# Patient Record
Sex: FEMALE | Race: OTHER | URBAN - METROPOLITAN AREA
[De-identification: names, ages, dates, MRNs, and addresses within clinical notes are randomized per-mention and may not be internally consistent; named-entity substitution may affect disease eponyms.]

---

## 2020-11-25 ENCOUNTER — HOSPITAL ENCOUNTER (INPATIENT)
Age: 45
LOS: 5 days | Discharge: HOME | DRG: 885 | End: 2020-11-30
Attending: PSYCHIATRY & NEUROLOGY | Admitting: PSYCHIATRY & NEUROLOGY
Payer: MEDICARE

## 2020-11-25 PROBLEM — F31.9 BIPOLAR DISORDER (HCC): Status: ACTIVE | Noted: 2020-11-25

## 2020-11-25 PROCEDURE — 74011250637 HC RX REV CODE- 250/637: Performed by: PSYCHIATRY & NEUROLOGY

## 2020-11-25 PROCEDURE — 65220000003 HC RM SEMIPRIVATE PSYCH

## 2020-11-25 RX ORDER — BENZTROPINE MESYLATE 1 MG/1
1 TABLET ORAL
Status: DISCONTINUED | OUTPATIENT
Start: 2020-11-25 | End: 2020-11-30 | Stop reason: HOSPADM

## 2020-11-25 RX ORDER — DIPHENHYDRAMINE HYDROCHLORIDE 50 MG/ML
50 INJECTION, SOLUTION INTRAMUSCULAR; INTRAVENOUS
Status: DISCONTINUED | OUTPATIENT
Start: 2020-11-25 | End: 2020-11-30 | Stop reason: HOSPADM

## 2020-11-25 RX ORDER — OLANZAPINE 5 MG/1
5 TABLET ORAL
Status: DISCONTINUED | OUTPATIENT
Start: 2020-11-25 | End: 2020-11-30 | Stop reason: HOSPADM

## 2020-11-25 RX ORDER — AMITRIPTYLINE HYDROCHLORIDE 100 MG/1
100 TABLET, FILM COATED ORAL
COMMUNITY
End: 2020-11-30

## 2020-11-25 RX ORDER — HYDROXYZINE 50 MG/1
50 TABLET, FILM COATED ORAL
Status: DISCONTINUED | OUTPATIENT
Start: 2020-11-25 | End: 2020-11-30 | Stop reason: HOSPADM

## 2020-11-25 RX ORDER — LORAZEPAM 2 MG/ML
1 INJECTION INTRAMUSCULAR
Status: DISCONTINUED | OUTPATIENT
Start: 2020-11-25 | End: 2020-11-30 | Stop reason: HOSPADM

## 2020-11-25 RX ORDER — IBUPROFEN 200 MG
1 TABLET ORAL DAILY
Status: DISCONTINUED | OUTPATIENT
Start: 2020-11-25 | End: 2020-11-30 | Stop reason: HOSPADM

## 2020-11-25 RX ORDER — HALOPERIDOL 5 MG/ML
5 INJECTION INTRAMUSCULAR
Status: DISCONTINUED | OUTPATIENT
Start: 2020-11-25 | End: 2020-11-30 | Stop reason: HOSPADM

## 2020-11-25 RX ORDER — ACETAMINOPHEN 325 MG/1
650 TABLET ORAL
Status: DISCONTINUED | OUTPATIENT
Start: 2020-11-25 | End: 2020-11-30 | Stop reason: HOSPADM

## 2020-11-25 RX ORDER — ADHESIVE BANDAGE
30 BANDAGE TOPICAL DAILY PRN
Status: DISCONTINUED | OUTPATIENT
Start: 2020-11-25 | End: 2020-11-30 | Stop reason: HOSPADM

## 2020-11-25 RX ORDER — TRAZODONE HYDROCHLORIDE 50 MG/1
50 TABLET ORAL
Status: DISCONTINUED | OUTPATIENT
Start: 2020-11-25 | End: 2020-11-30 | Stop reason: HOSPADM

## 2020-11-25 RX ADMIN — HYDROXYZINE HYDROCHLORIDE 50 MG: 50 TABLET, FILM COATED ORAL at 12:47

## 2020-11-25 RX ADMIN — HYDROXYZINE HYDROCHLORIDE 50 MG: 50 TABLET, FILM COATED ORAL at 18:20

## 2020-11-25 RX ADMIN — OLANZAPINE 5 MG: 5 TABLET, FILM COATED ORAL at 18:20

## 2020-11-25 RX ADMIN — TRAZODONE HYDROCHLORIDE 50 MG: 50 TABLET ORAL at 23:56

## 2020-11-25 NOTE — BH NOTES
1815:PRN Medication Documentation    Specific patient behavior that led to need for PRN medication: restless, loud, agitated  Staff interventions attempted prior to PRN being given: calming techniques  PRN medication given: PO Atarax PO Zyprexa  Patient response/effectiveness of PRN medication: will continue to monitor

## 2020-11-25 NOTE — BH NOTES
GROUP THERAPY PROGRESS NOTE    Patient is participating in Art/Gratitude Group     Group time: 50 minutes    Personal goal for participation: make a thanksgiving card and create gratitude list     Goal orientation: Personal    Group therapy participation: active    Therapeutic interventions reviewed and discussed: Group members participated in art therapy. They created a Thanksgiving card and decorated it. Each member then stated at least 5 things they are thankful for. The goal is to reconnect with the experience and reflect on the feelings of gratitude and pleasure that they experienced. Impression of participation: Swapnil Love actively participated in group. She expressed 5 things she was thankful for, but did not complete a Thanksgiving card. Patient was agitated when talking about her mother and personal story. Redirected several times to stay on topic in group.      Nellie Friedman, Supervisee in Social Work

## 2020-11-25 NOTE — BH NOTES
GROUP THERAPY PROGRESS NOTE    Patient is participating in Process Group. Group time: 50 minutes    Personal goal for participation: Removing negative thoughts from your life and exploring positive things about oneself     Goal orientation: Personal    Group therapy participation: active    Therapeutic interventions reviewed and discussed: Group discussion around negative thoughts and how to remove them from an individuals life. Group members were given a piece of paper that they cut up into sections. On these pieces, patients wrote down negative thoughts or negative things people have told them. They then ripped the sheet of paper and threw it in the trash and explained why that isnt true. Group discussion involved other ways to remove these thoughts: distraction, expressing gratitude, labeling your thoughts, and changing your relationship with your brain. Then, everyone in group was asked to complete the activity Toot your own horn by filling in and completing various sentences about themselves in a positive way. Group discussion around challenges completing this activity and different thoughts that patients pondered upon while doing so. Each member shared their activity, if they wanted to, and discussed things they learned about themselves or where there is room for growth. Impression of participation: Sarath Nnuo actively participated in group. She shared her negative thought on a piece of paper and set a goal for what she will do to rid it. This was getting her mother out of her life. Patient completed the second activity and expressed her happiness. Per patient, her self-image has been improving and she is happy in her current life.      Nellie Friedman, Supervisee in Social Work

## 2020-11-25 NOTE — BH NOTES
PRN Medication Documentation    Specific patient behavior that led to need for PRN medication: Anxious. Walking around on unit, loudly proclaiming her reasons for being here and how she \"hates\" her mom.   Staff interventions attempted prior to PRN being given: Redirection  PRN medication given: Atarax 50 mg given at [id number] [phone] [id number]  Patient response/effectiveness of PRN medication: TBD

## 2020-11-25 NOTE — PROGRESS NOTES
Admission Medication Reconciliation:    Information obtained from:  Patient interview, [facility]  RxQuery data available¹:  NO    Comments/Recommendations: Updated PTA meds/reviewed patient's allergies. 1)  Patient reports that she was taking lurasidone 40mg (samples) prior to her hospitalization. She states that she decided to go down on the dose to 40mg back in August because she did not like the way it made her feel. She states that she \"almost blacked out while driving. \" When asked if this is an acute change (since she's been taking lurasidone for years), she answered yes. She also takes amitriptyline 100mg QHS for sleep. Ms. Luisito Velasco has previously been on trazodone (stopped working for her), quetiapine (helped well but made her groggy), and lithium (did not like the frequent lab monitoring). 2)  Medication changes (since last review): Added  - amitriptyline 100mg QHS    3)  The [place] Prescription Monitoring Program () was assessed to determine fill history of any controlled medications. The patient has NOT filled any controlled medications in the last 12 months. ¹RxQuery pharmacy benefit data reflects medications filled and processed through the patient's insurance, however   this data does NOT capture whether the medication was picked up or is currently being taken by the patient. Allergies:  Patient has no known allergies. Significant PMH/Disease States: No past medical history on file. Chief Complaint for this Admission:  No chief complaint on file. Prior to Admission Medications:   Prior to Admission Medications   Prescriptions Last Dose Informant Taking?   amitriptyline (ELAVIL) 100 mg tablet   Yes   Sig: Take 100 mg by mouth nightly. lurasidone (Latuda) 40 mg tab tablet   Yes   Sig: Take 40 mg by mouth daily (with dinner).  Indications: bipolar depression      Facility-Administered Medications: None     SAUMYA Rico

## 2020-11-25 NOTE — BH NOTES
PSYCHOSOCIAL ASSESSMENT  :Patient identifying info:  Gerry Ruff is a 39 y.o., female admitted 2020  9:13 AM     Presenting problem and precipitating factors: Pt was transferred from [facility], where she presented to the ED under an ECO obtained by her sister. According to [facility] ED assessment, Pt's family reported they were concerned about her stability and manic behavior, stating she was threatening to harm others, posting on social media to dead people, losing weight, and spending excessively. Pt disputes family's accounts. Pt is voluntary. She is followed by [facility]: Rosa Isela Michelle NP and Faith Campa for CM. Mental status assessment:    Strengths:   Family support  Stable housing    Collateral information:     Current psychiatric /substance abuse providers and contact info:   [facility]: Rosa Isela Michelle NP and Faith Campa for CM. Previous psychiatric/substance abuse providers and response to treatment:     Family history of mental illness or substance abuse:   Yes: father ETOH  MH issues: son, sister, brother, great aunt, cousins    Substance abuse history:  Marijuana 1x/week, episodic ETOH use, hx of meth and \"everything else. \"  Social History     Tobacco Use    Smoking status: Not on file   Substance Use Topics    Alcohol use: Not on file       History of biomedical complications associated with substance abuse :    Patient's current acceptance of treatment or motivation for change:  Low - states she does not know why she is here and does not want to be here    Family constellation:   Was living with mother and 2 sons, 33yo and 11yo - now living with Arabella toscano. Pt's father  by DUI when she was 9yo. Pt has an older sister and younger brother    Is significant other involved?    Yes - everton    Describe support system:   Family    Describe living arrangements and home environment:  Was living with mother and 2 sons, 33yo and 11yo - now living with Arabella toscano. 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     MELISSA Dunbar  2020

## 2020-11-25 NOTE — PROGRESS NOTES
Problem: Manic Behavior (Adult/Pediatric)  Goal: *STG: Demonstrates improvement in thought process and speech pattern  Outcome: Progressing Towards Goal  Goal: *STG: Maintains appropriate boundaries  Outcome: Progressing Towards Goal  Goal: Interventions  Outcome: Progressing Towards Goal

## 2020-11-26 LAB
ALBUMIN SERPL-MCNC: 3.1 G/DL (ref 3.5–5)
ALBUMIN/GLOB SERPL: 1 {RATIO} (ref 1.1–2.2)
ALP SERPL-CCNC: 71 U/L (ref 45–117)
ALT SERPL-CCNC: 18 U/L (ref 12–78)
ANION GAP SERPL CALC-SCNC: 4 MMOL/L (ref 5–15)
AST SERPL-CCNC: 11 U/L (ref 15–37)
ATRIAL RATE: 75 BPM
BILIRUB SERPL-MCNC: 0.5 MG/DL (ref 0.2–1)
BUN SERPL-MCNC: 14 MG/DL (ref 6–20)
BUN/CREAT SERPL: 16 (ref 12–20)
CALCIUM SERPL-MCNC: 9.1 MG/DL (ref 8.5–10.1)
CALCULATED P AXIS, ECG09: 51 DEGREES
CALCULATED R AXIS, ECG10: 49 DEGREES
CALCULATED T AXIS, ECG11: 32 DEGREES
CHLORIDE SERPL-SCNC: 106 MMOL/L (ref 97–108)
CHOLEST SERPL-MCNC: 138 MG/DL
CO2 SERPL-SCNC: 29 MMOL/L (ref 21–32)
CREAT SERPL-MCNC: 0.87 MG/DL (ref 0.55–1.02)
DIAGNOSIS, 93000: NORMAL
ERYTHROCYTE [DISTWIDTH] IN BLOOD BY AUTOMATED COUNT: 14.1 % (ref 11.5–14.5)
GLOBULIN SER CALC-MCNC: 3.2 G/DL (ref 2–4)
GLUCOSE P FAST SERPL-MCNC: 88 MG/DL (ref 65–100)
GLUCOSE SERPL-MCNC: 88 MG/DL (ref 65–100)
HCT VFR BLD AUTO: 37.9 % (ref 35–47)
HDLC SERPL-MCNC: 54 MG/DL
HDLC SERPL: 2.6 {RATIO} (ref 0–5)
HGB BLD-MCNC: 12.4 G/DL (ref 11.5–16)
LDLC SERPL CALC-MCNC: 68.8 MG/DL (ref 0–100)
LIPID PROFILE,FLP: NORMAL
MCH RBC QN AUTO: 30.1 PG (ref 26–34)
MCHC RBC AUTO-ENTMCNC: 32.7 G/DL (ref 30–36.5)
MCV RBC AUTO: 92 FL (ref 80–99)
NRBC # BLD: 0 K/UL (ref 0–0.01)
NRBC BLD-RTO: 0 PER 100 WBC
P-R INTERVAL, ECG05: 154 MS
PLATELET # BLD AUTO: 301 K/UL (ref 150–400)
PMV BLD AUTO: 10.1 FL (ref 8.9–12.9)
POTASSIUM SERPL-SCNC: 4.2 MMOL/L (ref 3.5–5.1)
PROT SERPL-MCNC: 6.3 G/DL (ref 6.4–8.2)
Q-T INTERVAL, ECG07: 408 MS
QRS DURATION, ECG06: 88 MS
QTC CALCULATION (BEZET), ECG08: 455 MS
RBC # BLD AUTO: 4.12 M/UL (ref 3.8–5.2)
SODIUM SERPL-SCNC: 139 MMOL/L (ref 136–145)
TRIGL SERPL-MCNC: 76 MG/DL (ref ?–150)
TSH SERPL DL<=0.05 MIU/L-ACNC: 1.04 UIU/ML (ref 0.36–3.74)
VENTRICULAR RATE, ECG03: 75 BPM
VLDLC SERPL CALC-MCNC: 15.2 MG/DL
WBC # BLD AUTO: 7.3 K/UL (ref 3.6–11)

## 2020-11-26 PROCEDURE — 82947 ASSAY GLUCOSE BLOOD QUANT: CPT

## 2020-11-26 PROCEDURE — 74011250637 HC RX REV CODE- 250/637: Performed by: PSYCHIATRY & NEUROLOGY

## 2020-11-26 PROCEDURE — 80061 LIPID PANEL: CPT

## 2020-11-26 PROCEDURE — 84443 ASSAY THYROID STIM HORMONE: CPT

## 2020-11-26 PROCEDURE — 93005 ELECTROCARDIOGRAM TRACING: CPT

## 2020-11-26 PROCEDURE — 36415 COLL VENOUS BLD VENIPUNCTURE: CPT

## 2020-11-26 PROCEDURE — 65220000003 HC RM SEMIPRIVATE PSYCH

## 2020-11-26 PROCEDURE — 85027 COMPLETE CBC AUTOMATED: CPT

## 2020-11-26 PROCEDURE — 80053 COMPREHEN METABOLIC PANEL: CPT

## 2020-11-26 RX ORDER — ARIPIPRAZOLE 5 MG/1
5 TABLET ORAL DAILY
Status: DISCONTINUED | OUTPATIENT
Start: 2020-11-26 | End: 2020-11-27

## 2020-11-26 RX ADMIN — HYDROXYZINE HYDROCHLORIDE 50 MG: 50 TABLET, FILM COATED ORAL at 13:56

## 2020-11-26 RX ADMIN — HYDROXYZINE HYDROCHLORIDE 50 MG: 50 TABLET, FILM COATED ORAL at 20:00

## 2020-11-26 RX ADMIN — ARIPIPRAZOLE 5 MG: 5 TABLET ORAL at 13:56

## 2020-11-26 RX ADMIN — HYDROXYZINE HYDROCHLORIDE 50 MG: 50 TABLET, FILM COATED ORAL at 08:11

## 2020-11-26 RX ADMIN — TRAZODONE HYDROCHLORIDE 50 MG: 50 TABLET ORAL at 22:02

## 2020-11-26 NOTE — PROGRESS NOTES
Problem: Manic Behavior (Adult/Pediatric)  Goal: *STG: Demonstrates improvement in thought process and speech pattern  Outcome: Progressing Towards Goal     Received pt in bed resting. Pt appears to be in no distress. Respirations even and unlabored. Continuing to monitor pt with q15 min safety rounds.

## 2020-11-26 NOTE — H&P
History & Physical    Primary Care Provider: UNKNOWN  Source of Information: Chart review. History of Presenting Illness:   Mady Napier is a 39 y.o. female with a PMH of Bipolar who was transferred from [facility], where she presented to the ED under an ECO obtained by her sister who reported they were concerned about her instability and manic behavior. Family stated she was threatening to harm others, posting on social media to dead people, losing weight, and spending excessively, pt disputes family's accounts. Pt is voluntary, she is [facility] by [facility]: Nora Long NP and Karen Chow for CM. The patient denies any fever, chills, chest pain, cough, congestion, recent illness, palpitations, or dysuria. Review of Systems:  A comprehensive review of systems was negative except for that written in the History of Present Illness. No past medical history on file. No past surgical history on file. Prior to Admission medications    Medication Sig Start Date End Date Taking? Authorizing Provider   lurasidone (Latuda) 40 mg tab tablet Take 40 mg by mouth daily (with dinner). Indications: bipolar depression   Yes Provider, Historical   amitriptyline (ELAVIL) 100 mg tablet Take 100 mg by mouth nightly. Yes Provider, Historical     Allergies no known allergies   No family history on file.      SOCIAL HISTORY:  Patient resides:  Independently X   Assisted Living    SNF    With family care       Smoking history:   None    Former    Chronic X     Drug history: Marijuana   None    Former    Chronic X     Alcohol history:   None X   Social    Chronic      Ambulates:   Independently X   w/cane    w/walker    w/wc    CODE STATUS:  DNR    Full X   Other      Objective:     Physical Exam:     Visit Vitals  /71   Pulse 73   Temp 98.1 °F (36.7 °C)   Resp 18   SpO2 98%   Breastfeeding No           Constitutional:  No acute distress, cooperative, pleasant    ENT:  Oral mucosa moist.    Resp:  CTA bilaterally. No wheezing/rhonchi/rales. No accessory muscle use. CV:  Regular rhythm, normal rate, no murmurs, gallops, rubs. /GI:  Soft, non distended, non tender, no guarding, BS present. Musculoskeletal:  No edema, warm, 2+ pulses throughout. Neurologic:  Moves all extremities. AAOx3, CN II-XII reviewed. Skin:  Good turgor, no rashes or ulcers  Psych:  Poor insight, intermittently anxious/ agitated. Data Review:     Recent Days:  Recent Labs     11/26/20  0631   WBC 7.3   HGB 12.4   HCT 37.9        Recent Labs     11/26/20  0631      K 4.2      CO2 29   GLU 88  88   BUN 14   CREA 0.87   CA 9.1   ALB 3.1*   ALT 18     No results for input(s): PH, PCO2, PO2, HCO3, FIO2 in the last 72 hours. 24 Hour Results:  Recent Results (from the past 24 hour(s))   METABOLIC PANEL, COMPREHENSIVE    Collection Time: 11/26/20  6:31 AM   Result Value Ref Range    Sodium 139 136 - 145 mmol/L    Potassium 4.2 3.5 - 5.1 mmol/L    Chloride 106 97 - 108 mmol/L    CO2 29 21 - 32 mmol/L    Anion gap 4 (L) 5 - 15 mmol/L    Glucose 88 65 - 100 mg/dL    BUN 14 6 - 20 MG/DL    Creatinine 0.87 0.55 - 1.02 MG/DL    BUN/Creatinine ratio 16 12 - 20      GFR est AA >60 >60 ml/min/1.73m2    GFR est non-AA >60 >60 ml/min/1.73m2    Calcium 9.1 8.5 - 10.1 MG/DL    Bilirubin, total 0.5 0.2 - 1.0 MG/DL    ALT (SGPT) 18 12 - 78 U/L    AST (SGOT) 11 (L) 15 - 37 U/L    Alk.  phosphatase 71 45 - 117 U/L    Protein, total 6.3 (L) 6.4 - 8.2 g/dL    Albumin 3.1 (L) 3.5 - 5.0 g/dL    Globulin 3.2 2.0 - 4.0 g/dL    A-G Ratio 1.0 (L) 1.1 - 2.2     GLUCOSE, FASTING    Collection Time: 11/26/20  6:31 AM   Result Value Ref Range    Glucose 88 65 - 100 MG/DL   CBC W/O DIFF    Collection Time: 11/26/20  6:31 AM   Result Value Ref Range    WBC 7.3 3.6 - 11.0 K/uL    RBC 4.12 3.80 - 5.20 M/uL    HGB 12.4 11.5 - 16.0 g/dL    HCT 37.9 35.0 - 47.0 %    MCV 92.0 80.0 - 99.0 FL MCH 30.1 26.0 - 34.0 PG    MCHC 32.7 30.0 - 36.5 g/dL    RDW 14.1 11.5 - 14.5 %    PLATELET 199 958 - 465 K/uL    MPV 10.1 8.9 - 12.9 FL    NRBC 0.0 0  WBC    ABSOLUTE NRBC 0.00 0.00 - 0.01 K/uL   TSH 3RD GENERATION    Collection Time: 11/26/20  6:31 AM   Result Value Ref Range    TSH 1.04 0.36 - 3.74 uIU/mL   LIPID PANEL    Collection Time: 11/26/20  6:31 AM   Result Value Ref Range    LIPID PROFILE          Cholesterol, total 138 <200 MG/DL    Triglyceride 76 <150 MG/DL    HDL Cholesterol 54 MG/DL    LDL, calculated 68.8 0 - 100 MG/DL    VLDL, calculated 15.2 MG/DL    CHOL/HDL Ratio 2.6 0.0 - 5.0           Imaging:     Assessment:     Active Problems:    Bipolar disorder ([facility] [address] (11/25/2020)           Plan:     Bipolar, manic behavior: per family has had increased instability and manic behavior,  threatening to harm others, posting on social media to dead people, losing weight, and spending excessively. Lives with her mother and kids however, noted she has verbalized she \"hates\" her mother.   -admit to [facility] to manage  -closely monitor    Tobacco Dependence: cessation encouraged. Nicotine patch. Marijuana Dependence: cessation encouraged.     DVTppx: up ad nadja  Code Status: Full Code  Diet: Regular  Activity: up ad nadja  Discharge: TBD         Signed By: Pauline Mabry NP     November 26, 2020

## 2020-11-26 NOTE — PROGRESS NOTES
Problem: Manic Behavior (Adult/Pediatric)  Goal: *STG: Participates in treatment plan  Outcome: Progressing Towards Goal  Note: Out on unit engaged and social. Hypomanic with pressured speech and flight of ideas, verbally intrusive into peers/staff conversation. Denies SI, Daily goal is to address medications and get her Latuda changed.  Staff focus is on medication education  Goal: *STG: Demonstrates improvement in thought process and speech pattern  Outcome: Progressing Towards Goal  Goal: *STG: Maintains appropriate boundaries  Outcome: Progressing Towards Goal  Goal: *STG: Attends activities and groups  Outcome: Progressing Towards Goal  Goal: Interventions  Outcome: Progressing Towards Goal

## 2020-11-26 NOTE — PROGRESS NOTES
Problem: Depressed Mood (Adult/Pediatric)  Goal: *STG: Complies with medication therapy  Outcome: Progressing Towards Goal     Visible on the unit. Anxious mood noted. Pressure speech noted. Pt denies SI. Continue to monitor. 1918: Hospitalist Consult:   Reason for consult: H&P   MD who received call: triage    Response: pending    2008: Dr. Jane Callahan has been notified.

## 2020-11-26 NOTE — INTERDISCIPLINARY ROUNDS
Behavioral Health Interdisciplinary Rounds Patient Name: Donald Gomez  Age: 39 y.o. Room/Bed:  748/ Primary Diagnosis: <principal problem not specified> Admission Status: Voluntary Readmission within 30 days: no 
Power of  in place: no 
Patient requires a blocked bed: no          Reason for blocked bed: VTE Prophylaxis: No 
 
Mobility needs/Fall risk: no 
Flu Vaccine : Pt refused Nutritional Plan: no 
Consults:         
Labs/Testing due today?: yes Sleep hours: 8 Participation in Care/Groups:  N/a Medication Compliant?: Yes PRNS (last 24 hours): Antipsychotic (PO), Antianxiety and Sleep Aid Restraints (last 24 hours):  no 
  
CIWA (range last 24 hours): COWS (range last 24 hours): Alcohol screening (AUDIT) completed -   AUDIT Score: 1 If applicable, date SBIRT discussed in treatment team AND documented:  
AUDIT Screen Score: AUDIT Score: 1 Document Brief Intervention (corresponds directly with the 5 A's, Ask, Advise, Assess, Assist, and Arrange): At- Risk Patients (Score 7-15 for women; 8-15 for men) Discuss concern patient is drinking at unhealthy levels known to increase risk of alcohol-related health problems. Is Patient ready to commit to change? If No: 
? Encourage reflection ? Discuss short term and long term health risks of consuming alcohol ? Barriers to change ? Reaffirm willingness to help / Educational materials provided If Yes: 
? Set goal 
? Plan 
? Educational materials provided Harmful use or Dependence (Score 16 or greater) ? Discuss short term and long term health risks of consuming alcohol ? Recommendations ? Negotiate drinking goal 
? Recommend addiction specialist/center ? Arrange follow-up appointments. Tobacco - patient is a smoker: Have You Used Tobacco in the Past 30 Days: Yes Illegal Drugs use: Have You Used Any Illegal Substances Over the Past 12 Months: No 
 
24 hour chart check complete: yes Patient goal(s) for today:  
Treatment team focus/goals: 
Progress note LOS:  1  Expected LOS: 
 
Financial concerns/prescription coverage:   
Family contact:      
Family requesting physician contact today:   
Discharge plan: Access to weapons :        
Outpatient provider(s):  
Patient's preferred phone number for follow up call : 
Patient's preferred e-mail address : 
Participating treatment team members: Arielle Asencio, * (assigned SW),

## 2020-11-27 PROCEDURE — 74011250637 HC RX REV CODE- 250/637: Performed by: PSYCHIATRY & NEUROLOGY

## 2020-11-27 PROCEDURE — 65220000003 HC RM SEMIPRIVATE PSYCH

## 2020-11-27 RX ORDER — ARIPIPRAZOLE 5 MG/1
5 TABLET ORAL
Status: DISCONTINUED | OUTPATIENT
Start: 2020-11-27 | End: 2020-11-30

## 2020-11-27 RX ADMIN — ARIPIPRAZOLE 5 MG: 5 TABLET ORAL at 21:23

## 2020-11-27 RX ADMIN — HYDROXYZINE HYDROCHLORIDE 50 MG: 50 TABLET, FILM COATED ORAL at 11:19

## 2020-11-27 RX ADMIN — ARIPIPRAZOLE 5 MG: 5 TABLET ORAL at 08:23

## 2020-11-27 RX ADMIN — TRAZODONE HYDROCHLORIDE 50 MG: 50 TABLET ORAL at 21:24

## 2020-11-27 RX ADMIN — HYDROXYZINE HYDROCHLORIDE 50 MG: 50 TABLET, FILM COATED ORAL at 18:43

## 2020-11-27 NOTE — PROGRESS NOTES
Problem: Falls - Risk of  Goal: *Absence of Falls  Description: Document Mansi Epstein Fall Risk and appropriate interventions in the flowsheet. Outcome: Progressing Towards Goal  Note: Fall Risk Interventions:            Medication Interventions: Patient to call before getting OOB          Pt. Received resting in bed, NAD, respirations even and unlabored. Will continue q15 safety rounds.

## 2020-11-27 NOTE — PROGRESS NOTES
Laboratory Monitoring for Antipsychotics: This patient is currently prescribed the following medication(s):   Current Facility-Administered Medications   Medication Dose Route Frequency    ARIPiprazole (ABILIFY) tablet 5 mg  5 mg Oral DAILY    nicotine (NICODERM CQ) 21 mg/24 hr patch 1 Patch  1 Patch TransDERmal DAILY     The following labs have been completed for monitoring of antipsychotics and/or mood stabilizers:    Height, Weight, BMI Estimation  Estimated body mass index is 33.66 kg/m² as calculated from the following:    Height as of this encounter: 160 cm (63\"). Weight as of this encounter: 86.2 kg (190 lb). Vital Signs/Blood Pressure  Visit Vitals  /78   Pulse 83   Temp 98.1 °F (36.7 °C)   Resp 18   Ht 160 cm (63\")   Wt 86.2 kg (190 lb)   SpO2 99%   Breastfeeding No   BMI 33.66 kg/m²     Renal Function, Hepatic Function and Chemistry  Estimated Creatinine Clearance: 86.7 mL/min (based on SCr of 0.87 mg/dL). Lab Results   Component Value Date/Time    Sodium 139 11/26/2020 06:31 AM    Potassium 4.2 11/26/2020 06:31 AM    Chloride 106 11/26/2020 06:31 AM    CO2 29 11/26/2020 06:31 AM    Anion gap 4 (L) 11/26/2020 06:31 AM    BUN 14 11/26/2020 06:31 AM    Creatinine 0.87 11/26/2020 06:31 AM    BUN/Creatinine ratio 16 11/26/2020 06:31 AM    Bilirubin, total 0.5 11/26/2020 06:31 AM    Protein, total 6.3 (L) 11/26/2020 06:31 AM    Albumin 3.1 (L) 11/26/2020 06:31 AM    Globulin 3.2 11/26/2020 06:31 AM    A-G Ratio 1.0 (L) 11/26/2020 06:31 AM    ALT (SGPT) 18 11/26/2020 06:31 AM    AST (SGOT) 11 (L) 11/26/2020 06:31 AM    Alk.  phosphatase 71 11/26/2020 06:31 AM     Lab Results   Component Value Date/Time    Glucose 88 11/26/2020 06:31 AM    Glucose 88 11/26/2020 06:31 AM     No results found for: HBA1C, HGBE8, [id number]    Hematology  Lab Results   Component Value Date/Time    WBC 7.3 11/26/2020 06:31 AM    RBC 4.12 11/26/2020 06:31 AM    HGB 12.4 11/26/2020 06:31 AM    HCT 37.9 11/26/2020 06:31 AM    MCV 92.0 11/26/2020 06:31 AM    MCH 30.1 11/26/2020 06:31 AM    MCHC 32.7 11/26/2020 06:31 AM    RDW 14.1 11/26/2020 06:31 AM    PLATELET 523 62/08/0378 06:31 AM     Lipids  Lab Results   Component Value Date/Time    Cholesterol, total 138 11/26/2020 06:31 AM    HDL Cholesterol 54 11/26/2020 06:31 AM    LDL, calculated 68.8 11/26/2020 06:31 AM    Triglyceride 76 11/26/2020 06:31 AM    CHOL/HDL Ratio 2.6 11/26/2020 06:31 AM     Thyroid Function  Lab Results   Component Value Date/Time    TSH 1.04 11/26/2020 06:31 AM     Pregnancy Status  No results found for: Rosa Maria Decree [id number], [id number], [id number], PREGU, POCHCG, MHCGN, HCGQR, THCGA1, SHCG, HCGN, [id number]    Assessment/Plan:  Recommended baseline laboratory monitoring has been completed based on this patient's current medication regimen. No further interventions are needed at this time. Follow-up metabolic monitoring labs should be completed in 3 months (quarterly for the first year of antipsychotic therapy).      Mickie Lundberg, KALID

## 2020-11-27 NOTE — PROGRESS NOTES
Problem: Manic Behavior (Adult/Pediatric)  Goal: *STG: Demonstrates improvement in thought process and speech pattern  Outcome: Progressing Towards Goal  Goal: *STG: Maintains appropriate boundaries  Outcome: Progressing Towards Goal  Goal: *STG: Attends activities and groups  Outcome: Progressing Towards Goal     Problem: Depressed Mood (Adult/Pediatric)  Goal: *STG: Complies with medication therapy  Outcome: Progressing Towards Goal

## 2020-11-27 NOTE — BH NOTES
Adult Progress Note    Date: 11/27/2020  Account Number:  [de-identified]  Name: Kandy Garcia  Diagnosis: bipolar disorder  Length of session: 10 minutes    Subjective:     Patient Active Problem List    Diagnosis Date Noted    Bipolar disorder ([address] 11/25/2020     No past surgical history on file. Allergies no known allergies   Social History     Tobacco Use    Smoking status: Not on file   Substance Use Topics    Alcohol use: Not on file      No family history on file. Review of Systems  Pertinent items are noted in HPI. Objective:         Patient Vitals for the past 8 hrs:   BP Temp Pulse Resp SpO2   11/27/20 0813 113/78 98.1 °F (36.7 °C) 83 18 99 %       Lab/Data Review:  Lab results reviewed. For significant abnormal values and values requiring intervention, see assessment and plan.     Mental Status exam: WNL slightly pressured speech, poor insight    Assessment/Plan:   Active Problems:    Bipolar disorder ([address] (11/25/2020)        Medications:    Current Facility-Administered Medications   Medication Dose Route Frequency    ARIPiprazole (ABILIFY) tablet 5 mg  5 mg Oral QHS    OLANZapine (ZyPREXA) tablet 5 mg  5 mg Oral Q6H PRN    haloperidol lactate (HALDOL) injection 5 mg  5 mg IntraMUSCular Q6H PRN    benztropine (COGENTIN) tablet 1 mg  1 mg Oral BID PRN    diphenhydrAMINE (BENADRYL) injection 50 mg  50 mg IntraMUSCular BID PRN    hydrOXYzine HCL (ATARAX) tablet 50 mg  50 mg Oral TID PRN    LORazepam (ATIVAN) injection 1 mg  1 mg IntraMUSCular Q4H PRN    traZODone (DESYREL) tablet 50 mg  50 mg Oral QHS PRN    acetaminophen (TYLENOL) tablet 650 mg  650 mg Oral Q4H PRN    magnesium hydroxide (MILK OF MAGNESIA) 400 mg/5 mL oral suspension 30 mL  30 mL Oral DAILY PRN    nicotine (NICODERM CQ) 21 mg/24 hr patch 1 Patch  1 Patch TransDERmal DAILY       Side Effects:  none    The following information was reviewed and discussed:  patient given opportunity to ask questions     Plan:  Change Abilify to HS

## 2020-11-27 NOTE — BH NOTES
PRN Medication Documentation    Specific patient behavior that led to need for PRN medication: Pt requesting prn for anxiety  Staff interventions attempted prior to PRN being given: therapeutic listening, coping skills encouraged  PRN medication given: atarax PO  Patient response/effectiveness of PRN medication: will monitor

## 2020-11-27 NOTE — H&P
[address] [facility] HISTORY AND PHYSICAL    Name:  Shane Rao  MR#:  [id number]  :  1975  ACCOUNT #:  [de-identified]  ADMIT DATE:  2020      CHIEF COMPLIANT:  \"They're all lying\"    IDENTIFYING INFORMATION/HISTORY OF PRESENT ILLNESS:  The patient is a 31-year-old female with history of bipolar disorder who presents on a voluntary basis for admission with concerns about britney. Her family alleged that the patient has been elevated and unable to care for herself. There were also allegations of significant substance abuse, which based on her urine drug screen, reportedly does not seem to be the case. She presents as elevated and certainly hypomanic, if not fully manic. She is somewhat inappropriate on interview and quite hyperverbal.  Reports indicate that she would have been held on a halfway order. She reports recent decreasing sleep, getting 4 hours per night or so, decreasing appetite, elevated energy. She does report some episodes of \"blacking out,\" but these have not been recent. She uses marijuana regularly, but this has not changed. She was maintained on Latuda, but cannot afford it and is considering moving out of state. She will no longer be able to obtain samples. She requests to be placed something more affordable. PSYCHIATRIC HISTORY:  She has been hospitalized in the past, most recently in . She is followed at [facility]. PAST MEDICAL HISTORY:  Fibromyalgia. SOCIAL HISTORY:  She is on disability, lives in [place], [place]. She is presently homeless. FAMILY HISTORY:  Significant for bipolar disorder, her paternal great aunt and uncle. MENTAL STATUS EXAMINATION:  This is an overweight white female, she is cooperative and oriented. There is no agitation, but she is certainly hyperactive and hyperverbal .  Her mood is \"good. \"  Affect is elevated. Speech is rapid and pressured. Thought process is linear.   Insight and judgement are fair.  Cognitive function likely below baseline. DIAGNOSES:  Bipolar disorder, most recent episode manic; unspecified anxiety disorder. TREATMENT GOAL/PLAN:  1. Admit to the hospital and provide for safety. 2.  Expand the database to get more information. 3.  Medication management. 4.  Discharge planning. ESTIMATED LENGTH OF STAY:  5-7 days with presumed plan to return to home. STRENGTHS:  Good physical health and willingness to participate in treatment.         Lysbeth Runner, MD      [id number]  D:  11/26/2020 18:47  T:  11/26/2020 21:49  JOB #:  [id number]  CC:

## 2020-11-27 NOTE — PROGRESS NOTES
Problem: Manic Behavior (Adult/Pediatric)  Goal: *STG: Participates in treatment plan  Outcome: Progressing Towards Goal     Problem: Patient Education: Go to Patient Education Activity  Goal: Patient/Family Education  Outcome: Progressing Towards Goal

## 2020-11-27 NOTE — BH NOTES
PRN Medication Documentation    Specific patient behavior that led to need for PRN medication: c/o anxiety  Staff interventions attempted prior to PRN being given: redirection,coping skills  PRN medication given: atarax Patient response/effectiveness of PRN medication: tl aware

## 2020-11-27 NOTE — BH NOTES
PRN Medication Documentation    Specific patient behavior that led to need for PRN medication: Patient endorses anxiety, stating that the PRN she received the night prior had been helpful. Staff interventions attempted prior to PRN being given: Review of medication plan. PRN medication given: hydroxyzine 50 mg PO. Patient response/effectiveness of PRN medication: Patient states her anxiety has decreased.

## 2020-11-27 NOTE — INTERDISCIPLINARY ROUNDS
Behavioral Health Interdisciplinary Rounds Patient Name: Lori De La Fuente  Age: 39 y.o. Room/Bed:  748/ Primary Diagnosis: <principal problem not specified> Admission Status: Voluntary Readmission within 30 days: no 
Power of  in place: no 
Patient requires a blocked bed: no          Reason for blocked bed: VTE Prophylaxis: No 
 
Mobility needs/Fall risk: no 
Flu Vaccine : pt. refused Nutritional Plan: no 
Consults:         
Labs/Testing due today?: no 
 
Sleep hours: 8 Participation in Care/Groups:   
Medication Compliant?: Yes PRNS (last 24 hours): Antianxiety and Sleep Aid Restraints (last 24 hours):  no 
  
CIWA (range last 24 hours): COWS (range last 24 hours): Alcohol screening (AUDIT) completed -   AUDIT Score: 1 If applicable, date SBIRT discussed in treatment team AND documented:  
AUDIT Screen Score: AUDIT Score: 1 Document Brief Intervention (corresponds directly with the 5 A's, Ask, Advise, Assess, Assist, and Arrange): At- Risk Patients (Score 7-15 for women; 8-15 for men) Discuss concern patient is drinking at unhealthy levels known to increase risk of alcohol-related health problems. Is Patient ready to commit to change? If No: 
? Encourage reflection ? Discuss short term and long term health risks of consuming alcohol ? Barriers to change ? Reaffirm willingness to help / Educational materials provided If Yes: 
? Set goal 
? Plan 
? Educational materials provided Harmful use or Dependence (Score 16 or greater) ? Discuss short term and long term health risks of consuming alcohol ? Recommendations ? Negotiate drinking goal 
? Recommend addiction specialist/center ? Arrange follow-up appointments. Tobacco - patient is a smoker: Have You Used Tobacco in the Past 30 Days: Yes Illegal Drugs use: Have You Used Any Illegal Substances Over the Past 12 Months: No 
 
24 hour chart check complete: yes Patient goal(s) for today: attend groups Treatment team focus/goals: medication mgmt Progress note: Pt reports no change in condition, continues to assert she does belong here, and states she will never speak to her family again because they put her here (hospital). Her mood is good, affect bright, speech pressured, insight fair. LOS:  2  Expected LOS:  
 
Financial concerns/prescription coverage:  [employer] Family contact:  None Family requesting physician contact today:  no 
Discharge plan: discharge home and follow-up with [address] [place] [address] Access to weapons :  no Outpatient provider(s): [facility] Patient's preferred phone number for follow up call : [phone] [facility]) Patient's preferred e-mail address : 
Participating treatment team members: LUCIO Vines; Arin Aguilera RN; Dr. Prakash Meyer

## 2020-11-27 NOTE — PROGRESS NOTES
Problem: Patient Education: Go to Patient Education Activity  Goal: Patient/Family Education  11/27/2020 1548 by Estrellita REIS  Outcome: Progressing Towards Goal  11/27/2020 1548 by Estrellita REIS  Outcome: Progressing Towards Goal     Problem: Depressed Mood (Adult/Pediatric)  Goal: *STG: Complies with medication therapy  Outcome: Progressing Towards Goal  Goal: Interventions  Outcome: Progressing Towards Goal     Problem: Falls - Risk of  Goal: *Absence of Falls  Description: Document Minesh Wong Fall Risk and appropriate interventions in the flowsheet.   Outcome: Progressing Towards Goal  Note: Fall Risk Interventions:    Medication Interventions: Patient to call before getting OOB

## 2020-11-28 PROCEDURE — 74011250637 HC RX REV CODE- 250/637: Performed by: PSYCHIATRY & NEUROLOGY

## 2020-11-28 PROCEDURE — 65220000003 HC RM SEMIPRIVATE PSYCH

## 2020-11-28 RX ADMIN — HYDROXYZINE HYDROCHLORIDE 50 MG: 50 TABLET, FILM COATED ORAL at 15:29

## 2020-11-28 RX ADMIN — TRAZODONE HYDROCHLORIDE 50 MG: 50 TABLET ORAL at 21:15

## 2020-11-28 RX ADMIN — ARIPIPRAZOLE 5 MG: 5 TABLET ORAL at 20:09

## 2020-11-28 RX ADMIN — HYDROXYZINE HYDROCHLORIDE 50 MG: 50 TABLET, FILM COATED ORAL at 09:09

## 2020-11-28 NOTE — PROGRESS NOTES
[facility]  Master Treatment Plan for Donta Noel    Date Treatment Plan Initiated: 11/28/20    Treatment Plan Modalities:  Type of Modality Amount  (x minutes) Frequency (x/week) Duration (x days) Name of Responsible Staff   [address] [facility] meetings to encourage peer interactions 15 7 1 ARMINDA Fritz     Group psychotherapy to assist in building coping skills and internal controls 60 7 1 Nellie Friedman   Therapeutic activity groups to build coping skills 60 7 1 Nellie Friedman   Psychoeducation in group setting to address:   Medication education   [address] PharmD   Coping skills   30 3 1 Heeddie Cardenasm   Relaxation techniques         Symptom management         Discharge planning   60 2 [address] [facility]    60 2 1 Chaplain FIGUEROA   61 1 1 [facility]/AA/NA         Physician medication management   13 7 1 Dr. Kishore Rose meeting/discharge planning   15 2 1 Julio Sanches and Sharla Duran                                Goal will be met by 12/1/20:    Problem: Manic Behavior (Adult/Pediatric)  Goal: *STG: Participates in treatment plan  Outcome: Progressing Towards Goal  Note: Pt participated in treatment team. Out on the unit for small periods of time. Less intrusive with peers and staff. Taking medications as scheduled. Goal: *STG: Demonstrates improvement in thought process and speech pattern  Outcome: Progressing Towards Goal  Note: Continues to be hypomanic with pressured speech. Goal: *STG: Maintains appropriate boundaries  Outcome: Progressing Towards Goal  Note: Less intrusive with staff and peers. Goal: *STG: Attends activities and groups  Outcome: Progressing Towards Goal  Note: Pt encouraged to attend groups and participate.   Goal: Interventions  Outcome: Progressing Towards Goal     Problem: Patient Education: Go to Patient Education Activity  Goal: Patient/Family Education  Outcome: Progressing Towards Goal     Problem: Depressed Mood (Adult/Pediatric)  Goal: *STG: Complies with medication therapy  Outcome: Progressing Towards Goal  Note: Taking medication as scheduled. Goal: Interventions  Outcome: Progressing Towards Goal     Problem: Falls - Risk of  Goal: *Absence of Falls  Description: Document Brittny Fall Risk and appropriate interventions in the flowsheet.   Outcome: Progressing Towards Goal  Note: Fall Risk Interventions:            Medication Interventions: Teach patient to arise slowly

## 2020-11-28 NOTE — PROGRESS NOTES
Pt appears to be sleeping. No distress noted. Respirations WNL. Will continue to monitor q15 for safety. Problem: Falls - Risk of  Goal: *Absence of Falls  Description: Document Veatrice Marker Fall Risk and appropriate interventions in the flowsheet.   Outcome: Progressing Towards Goal  Note: Fall Risk Interventions:            Medication Interventions: Teach patient to arise slowly

## 2020-11-28 NOTE — BH NOTES
GROUP THERAPY PROGRESS NOTE    Patient is participating in Discharge/Goals Group. Group time: 50 minutes    Personal goal for participation: Process feelings related to discharge and/or feelings/goals for today. Goal orientation: Personal    Group therapy participation: active    Therapeutic interventions reviewed and discussed: Group discussion was focused on discharge plans and anxiety related to this. Group members discussed what they planned to do once discharge and discharge plans. Discussion also related to support and communication issues that arise. Group members verbalized how they are feeling today, their personal goal for today, and goals for the week. Patients were given an opportunity to share any concerns and issues they were having    Impression of participation: Patient actively participated in group. Completed her safety plan and continues to express she is ready for discharge. Pt is supportive of other group members.      Nellie Friedman, Supervisee in Social Work

## 2020-11-28 NOTE — BH NOTES
7158: PRN Medication Documentation    Specific patient behavior that led to need for PRN medication: Pt requested medication. \"I'm anxious. \"   Staff interventions attempted prior to PRN being given: education, distraction, listening   PRN medication given: atarax  Patient response/effectiveness of PRN medication: pending    10:09: Medication has been effective.

## 2020-11-28 NOTE — BH NOTES
GROUP THERAPY PROGRESS NOTE    Patient did not participate in Coping Skills group.      Nellie Friedman, Supervisee in Social Work

## 2020-11-28 NOTE — BH NOTES
GROUP THERAPY PROGRESS NOTE    Patient is participating in Meditation Group. Group time: 50 minutes    Personal goal for participation: Alleviate stress and anxiety meditation    Goal orientation: Personal    Group therapy participation: active    Therapeutic interventions reviewed and discussed: Group members participated in a progressive muscle relaxation exercise. They listened to a recording, and felt tension in different parts of their body. Then they followed this up with a meditation session for anxiety. The goal is to relax the body and mind and think about situations that are stressors in an individuals life. Group discussion was about patients current feelings after their exercise. Impression of participation: Sarath Nuno participated in the exercise. She completed it and remained calm and cooperative.      Nellie Friedman, Supervisee in Social Work

## 2020-11-28 NOTE — BH NOTES
Adult Progress Note    Date: 11/28/2020  Account Number:  [de-identified]  Name: Arielle Asencio  Diagnosis: bipolar disorder  Length of session: 10 minutes    Subjective:   Patient endorses she feels well and has been doing well since admitted to the hospital. She denies SI/HI/AVH at this time and denies any adverse reactions to her medications. She endorses she feels like her mood is stable and has been eating and sleeping well. No adverse effects from her medications noted. Calm and compliant with staff. No aggression or behaviors noted at this time. Patient Active Problem List    Diagnosis Date Noted    Bipolar disorder ([facility] 11/25/2020     No past surgical history on file. Allergies no known allergies   Social History     Tobacco Use    Smoking status: Not on file   Substance Use Topics    Alcohol use: Not on file      No family history on file. Review of Systems  Pertinent items are noted in HPI. Objective:         Patient Vitals for the past 8 hrs:   BP Temp Pulse Resp SpO2   11/28/20 0804 (!) 142/80 97.8 °F (36.6 °C) 72 16 97 %       Lab/Data Review:  Lab results reviewed. For significant abnormal values and values requiring intervention, see assessment and plan.     Mental Status exam: WNL slightly pressured speech, poor insight    Assessment/Plan:   Active Problems:    Bipolar disorder ([facility] (11/25/2020)        Medications:    Current Facility-Administered Medications   Medication Dose Route Frequency    ARIPiprazole (ABILIFY) tablet 5 mg  5 mg Oral QHS    OLANZapine (ZyPREXA) tablet 5 mg  5 mg Oral Q6H PRN    haloperidol lactate (HALDOL) injection 5 mg  5 mg IntraMUSCular Q6H PRN    benztropine (COGENTIN) tablet 1 mg  1 mg Oral BID PRN    diphenhydrAMINE (BENADRYL) injection 50 mg  50 mg IntraMUSCular BID PRN    hydrOXYzine HCL (ATARAX) tablet 50 mg  50 mg Oral TID PRN    LORazepam (ATIVAN) injection 1 mg  1 mg IntraMUSCular Q4H PRN    traZODone (DESYREL) tablet 50 mg  50 mg Oral QHS PRN    acetaminophen (TYLENOL) tablet 650 mg  650 mg Oral Q4H PRN    magnesium hydroxide (MILK OF MAGNESIA) 400 mg/5 mL oral suspension 30 mL  30 mL Oral DAILY PRN    nicotine (NICODERM CQ) 21 mg/24 hr patch 1 Patch  1 Patch TransDERmal DAILY       Side Effects:  none    The following information was reviewed and discussed:  patient given opportunity to ask questions     Plan:  Change Abilify to HS

## 2020-11-28 NOTE — BH NOTES
PRN Medication Documentation    Specific patient behavior that led to need for PRN medication: Increasing anxiety, restlessness     Staff interventions attempted prior to PRN being given: Therapeutic communication     PRN medication given: Atarax 50mg PO     Patient response/effectiveness of PRN medication: Will continue to monitor.

## 2020-11-29 PROCEDURE — 74011250637 HC RX REV CODE- 250/637: Performed by: PSYCHIATRY & NEUROLOGY

## 2020-11-29 PROCEDURE — 65220000003 HC RM SEMIPRIVATE PSYCH

## 2020-11-29 RX ADMIN — TRAZODONE HYDROCHLORIDE 50 MG: 50 TABLET ORAL at 21:27

## 2020-11-29 RX ADMIN — ARIPIPRAZOLE 5 MG: 5 TABLET ORAL at 21:27

## 2020-11-29 RX ADMIN — HYDROXYZINE HYDROCHLORIDE 50 MG: 50 TABLET, FILM COATED ORAL at 16:58

## 2020-11-29 RX ADMIN — HYDROXYZINE HYDROCHLORIDE 50 MG: 50 TABLET, FILM COATED ORAL at 09:12

## 2020-11-29 NOTE — BH NOTES
Adult Progress Note    Date: 11/29/2020  Account Number:  [de-identified]  Name: Juan Gibson  Diagnosis: bipolar disorder  Length of session: 10 minutes    Subjective:   Patient endorses she feels well and has been doing well since admitted to the hospital. She denies SI/HI/AVH at this time and denies any adverse reactions to her medications. She endorses she feels like her mood is stable and has been eating and sleeping well. And endorses she would like to go home Monday morning. No adverse effects from her medications noted. Calm and compliant with staff. No aggression or behaviors noted at this time. Patient Active Problem List    Diagnosis Date Noted    Bipolar disorder ([facility] 11/25/2020     No past surgical history on file. Allergies no known allergies   Social History     Tobacco Use    Smoking status: Not on file   Substance Use Topics    Alcohol use: Not on file      No family history on file. Review of Systems  Pertinent items are noted in HPI. Objective:         Patient Vitals for the past 8 hrs:   BP Temp Pulse Resp SpO2 Weight   11/29/20 0745 97/70 98.2 °F (36.8 °C) 69 16 96 % 88 kg (194 lb)       Lab/Data Review:  Lab results reviewed. For significant abnormal values and values requiring intervention, see assessment and plan.     Mental Status exam: WNL slightly pressured speech, poor insight    Assessment/Plan:   Active Problems:    Bipolar disorder ([facility] (11/25/2020)        Medications:    Current Facility-Administered Medications   Medication Dose Route Frequency    ARIPiprazole (ABILIFY) tablet 5 mg  5 mg Oral QHS    OLANZapine (ZyPREXA) tablet 5 mg  5 mg Oral Q6H PRN    haloperidol lactate (HALDOL) injection 5 mg  5 mg IntraMUSCular Q6H PRN    benztropine (COGENTIN) tablet 1 mg  1 mg Oral BID PRN    diphenhydrAMINE (BENADRYL) injection 50 mg  50 mg IntraMUSCular BID PRN    hydrOXYzine HCL (ATARAX) tablet 50 mg  50 mg Oral TID PRN    LORazepam (ATIVAN) injection 1 mg  1 mg IntraMUSCular Q4H PRN    traZODone (DESYREL) tablet 50 mg  50 mg Oral QHS PRN    acetaminophen (TYLENOL) tablet 650 mg  650 mg Oral Q4H PRN    magnesium hydroxide (MILK OF MAGNESIA) 400 mg/5 mL oral suspension 30 mL  30 mL Oral DAILY PRN    nicotine (NICODERM CQ) 21 mg/24 hr patch 1 Patch  1 Patch TransDERmal DAILY       Side Effects:  none    The following information was reviewed and discussed:  patient given opportunity to ask questions     Plan:  Continue current plan of care.

## 2020-11-29 NOTE — PROGRESS NOTES
Problem: Manic Behavior (Adult/Pediatric)  Goal: *STG: Participates in treatment plan  Outcome: Progressing Towards Goal  Goal: *STG: Demonstrates improvement in thought process and speech pattern  Outcome: Progressing Towards Goal  Goal: *STG: Maintains appropriate boundaries  Outcome: Progressing Towards Goal  Goal: *STG: Attends activities and groups  Outcome: Progressing Towards Goal  Goal: Interventions  Outcome: Progressing Towards Goal     1658: PRN Medication Documentation    Specific patient behavior that led to need for PRN medication: anxiety   Staff interventions attempted prior to PRN being given: relaxation techiques  PRN medication given: atarax

## 2020-11-29 NOTE — BH NOTES
3931: PRN Medication Documentation    Specific patient behavior that led to need for PRN medication: \"I need something for anxiety. \"   Staff interventions attempted prior to PRN being given: distraction, listening, education  PRN medication given: atarax  Patient response/effectiveness of PRN medication: pending     10:12: Medication was effective.

## 2020-11-29 NOTE — PROGRESS NOTES
Problem: Manic Behavior (Adult/Pediatric)  Goal: *STG: Demonstrates improvement in thought process and speech pattern  Outcome: Progressing Towards Goal  Goal: *STG: Maintains appropriate boundaries  Outcome: Progressing Towards Goal  Goal: *STG: Attends activities and groups  Outcome: Progressing Towards Goal

## 2020-11-29 NOTE — BH NOTES
GROUP THERAPY PROGRESS NOTE    Patient did not participate in Guided Imagery group.      Nellie Friedman, Supervisee in Social Work

## 2020-11-29 NOTE — PROGRESS NOTES
Problem: Manic Behavior (Adult/Pediatric)  Goal: *STG: Participates in treatment plan  Outcome: Progressing Towards Goal  Note: Pt participated in treatment team. Less intrusive with staff and peers. Taking medications as scheduled. Focused on discharge this week. Attending groups and participating on the unit. Goal: Interventions  Outcome: Progressing Towards Goal     Problem: Patient Education: Go to Patient Education Activity  Goal: Patient/Family Education  Outcome: Progressing Towards Goal     Problem: Falls - Risk of  Goal: *Absence of Falls  Description: Document Mansi Epstein Fall Risk and appropriate interventions in the flowsheet.   Outcome: Progressing Towards Goal  Note: Fall Risk Interventions:            Medication Interventions: Teach patient to arise slowly

## 2020-11-29 NOTE — PROGRESS NOTES
Problem: Falls - Risk of  Goal: *Absence of Falls  Description: Document Americo Madsen Fall Risk and appropriate interventions in the flowsheet. Outcome: Progressing Towards Goal  Note: Fall Risk Interventions:        Medication Interventions: Teach patient to arise slowly     Patient asleep in bed at this time. Respirations regular and even. Continue to monitor q 15 minutes for safety.

## 2020-11-29 NOTE — BH NOTES
GROUP THERAPY PROGRESS NOTE    Patient did not participate in Wellness Group.      Nellie Friedman, Supervisee in Social Work

## 2020-11-29 NOTE — BH NOTES
GROUP THERAPY PROGRESS NOTE    Patient is participating in Process Group. Group time: 50 minutes    Personal goal for participation: Increase self-awareness of internal and external triggers. Goal orientation: Personal    Group therapy participation: active    Therapeutic interventions reviewed and discussed: Group discussion was focused on internal and external triggers, and the feelings/behaviors elicited as a result. Group members analyzed their emotional triggers and how it affects their anxiety. Each person was asked to color in or write where they felt the most anxiety on the stick figure given. Then, the group discussed ways to de-escalate situational anxiety, and use the Emotional Freedom Technique, known as the tapping technique. Impression of participation: Kelvin Melton actively participated in group. Patient shared her triggers and set a goal to remove her family members that are negatively affecting her from her life.  Pt was encouraged to focus on her mental health and put herself first.     Lennox Neves, Supervisee in Social Work

## 2020-11-29 NOTE — BH NOTES
Behavioral Health Interdisciplinary Rounds     Patient Name: Jaskaran Lopez  Age: 39 y.o. Room/Bed:  729/  Primary Diagnosis: <principal problem not specified>   Admission Status: Voluntary     Readmission within 30 days: no  Power of  in place: no  Patient requires a blocked bed: no          Reason for blocked bed: n/a    VTE Prophylaxis: No    Mobility needs/Fall risk: no  Flu Vaccine : patient refused   Nutritional Plan: no  Consults:          Labs/Testing due today?: no    Sleep hours: 7 1/4       Participation in Care/Groups:  yes  Medication Compliant?: Yes  PRNS (last 24 hours): Antianxiety and Sleep Aid    Restraints (last 24 hours):  no     CIWA (range last 24 hours):     COWS (range last 24 hours):      Alcohol screening (AUDIT) completed -   AUDIT Score: 1     If applicable, date SBIRT discussed in treatment team AND documented:   AUDIT Screen Score: AUDIT Score: 1      Document Brief Intervention (corresponds directly with the 5 A's, Ask, Advise, Assess, Assist, and Arrange): At- Risk Patients (Score 7-15 for women; 8-15 for men)  Discuss concern patient is drinking at unhealthy levels known to increase risk of alcohol-related health problems. Is Patient ready to commit to change? If No:   Encourage reflection   Discuss short term and long term health risks of consuming alcohol   Barriers to change   Reaffirm willingness to help / Educational materials provided  If Yes:   Set goal  Driblet provided    Harmful use or Dependence (Score 16 or greater)   Discuss short term and long term health risks of consuming alcohol   Recommendations   Negotiate drinking goal   Recommend addiction specialist/center   Arrange follow-up appointments.     Tobacco - patient is a smoker: Have You Used Tobacco in the Past 30 Days: Yes  Illegal Drugs use: Have You Used Any Illegal Substances Over the Past 12 Months: No    24 hour chart check complete: yes     Patient goal(s) for today:   Treatment team focus/goals:   Progress note     LOS:  4  Expected LOS:     Financial concerns/prescription coverage:    Family contact:       Family requesting physician contact today:    Discharge plan:   Access to weapons : no        Outpatient provider(s):   Patient's preferred phone number for follow up call :   Patient's preferred e-mail address :  Participating treatment team members: Leland Cuevas, * (assigned SW),

## 2020-11-29 NOTE — BH NOTES
GROUP THERAPY PROGRESS NOTE    Patient did not participate in Anger group.      Nellie Friedman, Supervisee in Social Work

## 2020-11-30 VITALS
RESPIRATION RATE: 16 BRPM | HEIGHT: 63 IN | HEART RATE: 73 BPM | BODY MASS INDEX: 34.38 KG/M2 | WEIGHT: 194 LBS | DIASTOLIC BLOOD PRESSURE: 67 MMHG | SYSTOLIC BLOOD PRESSURE: 103 MMHG | TEMPERATURE: 98.1 F | OXYGEN SATURATION: 98 %

## 2020-11-30 PROCEDURE — 74011250637 HC RX REV CODE- 250/637: Performed by: PSYCHIATRY & NEUROLOGY

## 2020-11-30 RX ORDER — HYDROXYZINE 50 MG/1
50 TABLET, FILM COATED ORAL
Qty: 30 TAB | Refills: 0 | Status: SHIPPED | OUTPATIENT
Start: 2020-11-30

## 2020-11-30 RX ORDER — ARIPIPRAZOLE 15 MG/1
15 TABLET ORAL
Qty: 30 TAB | Refills: 0 | Status: SHIPPED | OUTPATIENT
Start: 2020-11-30

## 2020-11-30 RX ORDER — TRAZODONE HYDROCHLORIDE 50 MG/1
50 TABLET ORAL
Qty: 30 TAB | Refills: 0 | Status: SHIPPED | OUTPATIENT
Start: 2020-11-30

## 2020-11-30 RX ADMIN — HYDROXYZINE HYDROCHLORIDE 50 MG: 50 TABLET, FILM COATED ORAL at 08:14

## 2020-11-30 RX ADMIN — HYDROXYZINE HYDROCHLORIDE 50 MG: 50 TABLET, FILM COATED ORAL at 16:40

## 2020-11-30 NOTE — DISCHARGE INSTRUCTIONS
DISCHARGE SUMMARY    Marilee Celeste  : 1975  MRN: [id number]    The patient Esther Kennedy exhibits the ability to control behavior in a less restrictive environment. Patient's level of functioning is improving. No assaultive/destructive behavior has been observed for the past 24 hours. No suicidal/homicidal threat or behavior has been observed for the past 24 hours. There is no evidence of serious medication side effects. Patient has not been in physical or protective restraints for at least the past 24 hours. If weapons involved, how are they secured? NA    Is patient aware of and in agreement with discharge plan? Yes    Arrangements for medication:  NA  Copy of discharge instructions to provider?:  [facility]    Arrangements for transportation home: male friend will pick pt up     Keep all follow up appointments as scheduled, continue to take prescribed medications per physician instructions. Mental health crisis number:  730 or your local mental health crisis line number at [address] [place] [address]    A suicide Safety Plan is a document that supports someone when they are having thoughts of suicide. Warning Signs that indicate a suicidal crisis may be developing: What (situations, thoughts, feelings, body sensations, behaviors, etc.) do you experience that lets you know you are beginning to think about suicide? 1. Family drama      Internal Coping Strategies:  What things can I do (relaxation techniques, hobbies, physical activities, etc.) to take my mind off my problems without contacting another person? 1. yoga  2. Working out at gym  3. Read a book    People and social settings that provide distraction: Who can I call or where can I go to distract me? 1. Name: Maricel Andrews [phone]   2. Name: Dedrick Lovett  programmed into my phone                                   3. Place:  [place] [id number]      4. Place:  Giuseppe Sarah 1 whom I can ask for help:  Who can I call when I need help - for example, friends, family, clergy, someone else? 1. Linkdori Elliott [phone]  2. [facility] or [facility] agencies I can contact during a crisis: Who can I call for help - for example, my doctor, my psychiatrist, my psychologist, a mental health provider, a suicide hotline? 1. Clinician Name: Abdulkadir Dunaway  Phone: [phone]     2. Suicide Prevention Lifeline: 0-601-648-DIMD (2661)  3. [facility]: [address], [facility], [address] [place] [address] [address]  CRISIS:[phone]    Making the environment safe: How can I make my environment (house/apartment/living space) safer? For example, can I remove guns, medications, and other items? 1. Healthy boundaries with family members  2.  Share my safety plan with my support system

## 2020-11-30 NOTE — PROGRESS NOTES
Pt appears asleep. Respirations even and unlabored. No distress noted. Will continue to monitor q15 for safety. Problem: Falls - Risk of  Goal: *Absence of Falls  Description: Document Leora Panchal Fall Risk and appropriate interventions in the flowsheet.   Outcome: Progressing Towards Goal  Note: Fall Risk Interventions:            Medication Interventions: Teach patient to arise slowly

## 2020-11-30 NOTE — PROGRESS NOTES
Problem: Manic Behavior (Adult/Pediatric)  Goal: *STG: Attends activities and groups  Outcome: Progressing Towards Goal    Visible on the unit. Mood is labile. Compliant with meals and medications. Pt denies SI. Continue to monitor.

## 2020-11-30 NOTE — BH NOTES
PRN Medication Documentation    Specific patient behavior that led to need for PRN medication: anxiety  Staff interventions attempted prior to PRN being given: listening, encourage coping skills  PRN medication given: Atarax 50mg PO given @ 7843  Patient response/effectiveness of PRN medication:0914: voiced no further complaints

## 2020-11-30 NOTE — INTERDISCIPLINARY ROUNDS
Behavioral Health Interdisciplinary Rounds Patient Name: Crow Shepard  Age: 39 y.o. Room/Bed:  729/ Primary Diagnosis: <principal problem not specified> Admission Status: Voluntary Readmission within 30 days: no 
Power of  in place: no 
Patient requires a blocked bed: no          Reason for blocked bed: VTE Prophylaxis: No 
 
Mobility needs/Fall risk: no 
Flu Vaccine : no  
Nutritional Plan: no 
Consults:         
Labs/Testing due today?: no 
 
Sleep hours:  8 Participation in Care/Groups:  yes Medication Compliant?: Yes PRNS (last 24 hours): Antianxiety and Sleep Aid Restraints (last 24 hours):  no 
  
CIWA (range last 24 hours): COWS (range last 24 hours): Alcohol screening (AUDIT) completed -   AUDIT Score: 1 If applicable, date SBIRT discussed in treatment team AND documented:  
AUDIT Screen Score: AUDIT Score: 1 Document Brief Intervention (corresponds directly with the 5 A's, Ask, Advise, Assess, Assist, and Arrange): At- Risk Patients (Score 7-15 for women; 8-15 for men) Discuss concern patient is drinking at unhealthy levels known to increase risk of alcohol-related health problems. Is Patient ready to commit to change? If No: 
? Encourage reflection ? Discuss short term and long term health risks of consuming alcohol ? Barriers to change ? Reaffirm willingness to help / Educational materials provided If Yes: 
? Set goal 
? Plan 
? Educational materials provided Harmful use or Dependence (Score 16 or greater) ? Discuss short term and long term health risks of consuming alcohol ? Recommendations ? Negotiate drinking goal 
? Recommend addiction specialist/center ? Arrange follow-up appointments. Tobacco - patient is a smoker: Have You Used Tobacco in the Past 30 Days: Yes Illegal Drugs use: Have You Used Any Illegal Substances Over the Past 12 Months: No 
 
24 hour chart check complete: yes Patient goal(s) for today: attend groups Treatment team focus/goals: medication mgmt Progress note: Pt reports feeling better today -  bright affect with slightly pressured speech. She remains mildly intrusive and labile -  increasing medication and will discharge 12/1/20 if no side effects. Appt 12/22/2020 @ 10:15am with her psychiatrist, Win Issa, at [address] [facility]. 
 
LOS:  5                        Expected LOS:  
  
Financial concerns/prescription coverage:  Medicare Family contact:  None Family requesting physician contact today:  no 
Discharge plan: discharge home 12/1/20 and follow-up with PettyNicolás [address] [place] [facility], [id number] [phone] Access to weapons :  no [facility] Patient's preferred phone number for follow up call : [phone] [facility]) Patient's preferred e-mail address : 
Participating treatment team members: Alberto Rincon;  Alyce Ahumada, MSW; Reena Siddiqi RN; SAUMYA Harden; Steve Black NP

## 2020-11-30 NOTE — BH NOTES
Adult Progress Note    Date: 11/30/2020  Account Number:  [de-identified]  Name: Amrit Junior  Diagnosis: bipolar disorder  Length of session: 10 minutes    Subjective:   Patient states she is doing a lot better, \"I feel great. \" Denies si hi or avh. Sleeping and eating well. She is on a such a low dose of abilify, she is aware she could potentially have another manic episode. She is open in increasing abilify to 15mg and plan for dc tomorrow. Denies medication side effects. Patient Active Problem List    Diagnosis Date Noted    Bipolar disorder ([facility] 11/25/2020     No past surgical history on file. Allergies no known allergies   Social History     Tobacco Use    Smoking status: Not on file   Substance Use Topics    Alcohol use: Not on file      No family history on file. Review of Systems  Pertinent items are noted in HPI. Objective:         Patient Vitals for the past 8 hrs:   BP Temp Pulse Resp SpO2   11/30/20 0737 106/78 97.4 °F (36.3 °C) 70 16 98 %       Lab/Data Review:  Lab results reviewed. For significant abnormal values and values requiring intervention, see assessment and plan.     Mental Status exam: WNL slightly pressured speech, poor insight    Assessment/Plan:   Active Problems:    Bipolar disorder ([facility] (11/25/2020)        Medications:    Current Facility-Administered Medications   Medication Dose Route Frequency    ARIPiprazole (ABILIFY) tablet 15 mg  15 mg Oral QHS    OLANZapine (ZyPREXA) tablet 5 mg  5 mg Oral Q6H PRN    haloperidol lactate (HALDOL) injection 5 mg  5 mg IntraMUSCular Q6H PRN    benztropine (COGENTIN) tablet 1 mg  1 mg Oral BID PRN    diphenhydrAMINE (BENADRYL) injection 50 mg  50 mg IntraMUSCular BID PRN    hydrOXYzine HCL (ATARAX) tablet 50 mg  50 mg Oral TID PRN    LORazepam (ATIVAN) injection 1 mg  1 mg IntraMUSCular Q4H PRN    traZODone (DESYREL) tablet 50 mg  50 mg Oral QHS PRN    acetaminophen (TYLENOL) tablet 650 mg  650 mg Oral Q4H PRN    magnesium hydroxide (MILK OF MAGNESIA) 400 mg/5 mL oral suspension 30 mL  30 mL Oral DAILY PRN    nicotine (NICODERM CQ) 21 mg/24 hr patch 1 Patch  1 Patch TransDERmal DAILY       Side Effects:  none    The following information was reviewed and discussed:  patient given opportunity to ask questions     Plan:  Increase abilify to 15mg.   Plan for dc in am.

## 2020-11-30 NOTE — BH NOTES
PRN Medication Documentation    Specific patient behavior that led to need for PRN medication: c/o anxiety Staff interventions attempted prior to PRN being given: redirection   PRN medication given: atarax Patient response/effectiveness of PRN medication: elisabeth aware

## 2020-11-30 NOTE — PROGRESS NOTES
Problem: Manic Behavior (Adult/Pediatric)  Goal: *STG: Participates in treatment plan  Outcome: Progressing Towards Goal  Note: Out on unit engaged and social w peers and staff. Mood and affect bright labile. Demonstrates intrusive behaviors at times. Requires verbal redirection and prompting to follow unit routine and to use self control. Daily goal is to discuss d/c planning.  Staff focus is on d/c planning education  Goal: *STG: Demonstrates improvement in thought process and speech pattern  Outcome: Progressing Towards Goal  Goal: *STG: Maintains appropriate boundaries  Outcome: Progressing Towards Goal  Goal: *STG: Attends activities and groups  Outcome: Progressing Towards Goal  Goal: Interventions  Outcome: Progressing Towards Goal

## 2020-11-30 NOTE — BH NOTES
Pt informed writer she wants to be discharged today instead of tomorrow. \"Jing said I could leave today AMA. \" Education provided, pt stated, \"I still want to leave. \"     Called and left a voicemail for [employer] and spoke with CHATA Richard. Order received for [facility] discharge. DC instructions explained to pt and a copy provided. All belongings given to pt. Pt is calm and cooperative. Pt denies SI/HI. Pt states, \"I shouldn't have been here. \"

## 2020-12-01 NOTE — DISCHARGE SUMMARY
PSYCHIATRIC DISCHARGE SUMMARY      Patient: Albreto Rincon MRN: [id number]  SSN: [id number]    YOB: 1975  Age: 39 y.o. Sex: female        Date of Admission: 11/25/2020  Date of Discharge:11/30/2020       Type of Discharge: AMA      Admission data:  CHIEF COMPLIANT:  \"They're all lying\"     IDENTIFYING INFORMATION/HISTORY OF PRESENT ILLNESS:  The patient is a 47-year-old female with history of bipolar disorder who presents on a voluntary basis for admission with concerns about britney. Her family alleged that the patient has been elevated and unable to care for herself. There were also allegations of significant substance abuse, which based on her urine drug screen, reportedly does not seem to be the case. She presents as elevated and certainly hypomanic, if not fully manic. She is somewhat inappropriate on interview and quite hyperverbal.  Reports indicate that she would have been held on a prison order. She reports recent decreasing sleep, getting 4 hours per night or so, decreasing appetite, elevated energy. She does report some episodes of \"blacking out,\" but these have not been recent. She uses marijuana regularly, but this has not changed. She was maintained on Latuda, but cannot afford it and is considering moving out of state. She will no longer be able to obtain samples. She requests to be placed something more affordable.     PSYCHIATRIC HISTORY:  She has been hospitalized in the past, most recently in 2009. She is followed at [facility].     PAST MEDICAL HISTORY:  Fibromyalgia.     SOCIAL HISTORY:  She is on disability, lives in [place], [place]. She is presently homeless.     FAMILY HISTORY:  Significant for bipolar disorder, her paternal great aunt and uncle.     MENTAL STATUS EXAMINATION:  This is an overweight white female, she is cooperative and oriented. There is no agitation, but she is certainly hyperactive and hyperverbal .  Her mood is \"good. \"  Affect is elevated. Speech is rapid and pressured. Thought process is linear. Insight and judgement are fair. Cognitive function likely below baseline.     DIAGNOSES:  Bipolar disorder, most recent episode manic; unspecified anxiety disorder. Hospital Course:    Patient was admitted to the Psychiatric services for acute psychiatric stabilization in regards to symptomatology as described in the HPI above and placed on Q15 minute checks and suicide precautions. Standing medications were ordered. She was started on abilify. While on the unit Stefan Mackay was involved in individual, group, occupational and milieu therapy. She improved gradually and was able to integrate into the milieu with help from the nursing staff. She was appropriate in her interactions, and cooperative with medications and the unit routine. Please see individual progress notes for more specific details regarding patient's hospitalization course. Patient was only abilify 5mg, I had a lengthy discussion with her today to stay another day since she was still hypomanic and that she would benefit from increased of abilify. She agreed to stay another day. She also agreed to increased her abilify to 15mg. Later in the day. She requested to be discharged, she did not meet tdo criteria. Denied si/hi/avh. The patient was discharged against medical advice. She was informed regarding the risks of leaving AMA. She was discharged to self care. She will be followed by her outpatient provider. At discharge, she was given prescriptions. Some parts of the discharge summary are from the initial Psychiatric interview that was done on admission by the admitting psychiatrist.       Allergies:(reviewed/updated 11/30/2020)  Allergies no known allergies    Side Effects: (reviewed/updated 11/30/2020)  None reported or admitted to.     Vital Signs:  Patient Vitals for the past 24 hrs:   Temp Pulse Resp BP SpO2   11/30/20 1605 98.1 °F (36.7 °C) 73 16 103/67 98 %   11/30/20 0737 97.4 °F (36.3 °C) 70 16 106/78 98 %     Wt Readings from Last 3 Encounters:   11/29/20 88 kg (194 lb)     Temp Readings from Last 3 Encounters:   11/30/20 98.1 °F (36.7 °C)     BP Readings from Last 3 Encounters:   11/30/20 103/67     Pulse Readings from Last 3 Encounters:   11/30/20 73       Labs: (reviewed/updated 11/30/2020)  No results found for this or any previous visit (from the past 24 hour(s)). No results found for: VALF2, VALAC, VALP, VALPR, DS6, CRBAM, CRBAMP, CARB2, XCRBAM  No results found for: [facility] [place]    Radiology (reviewed/updated 11/30/2020)  No results found. Mental Status Exam on Discharge:  General appearance:   Ronny Augustin is a 39 y.o. OTHER female who is well groomed, psychomotor activity is WNL  Eye contact: makes good eye contact  Speech: Spontaneous and coherent  Affect : constricted  Mood: \"great\"  Thought Process: Logical, goal directed  Perception: Denies any AH or VH. Thought Content: Denies any SI or Plan  Insight: Poor  Judgement: Poor  Cognition: Intact grossly. Discharge Diagnosis:   Bipolar disorder, most recent episode manic; unspecified anxiety disorder. Discharge Medication List as of 11/30/2020  6:01 PM      START taking these medications    Details   ARIPiprazole (ABILIFY) 15 mg tablet Take 1 Tab by mouth nightly. Indications: britney associated with bipolar disorder, Normal, Disp-30 Tab,R-0      hydrOXYzine HCL (ATARAX) 50 mg tablet Take 1 Tab by mouth three (3) times daily as needed for Anxiety. Indications: anxious, Normal, Disp-30 Tab,R-0      traZODone (DESYREL) 50 mg tablet Take 1 Tab by mouth nightly as needed for Sleep (For insomnia). Indications: insomnia associated with depression, Normal, Disp-30 Tab,R-0         STOP taking these medications       lurasidone (Latuda) 40 mg tab tablet Comments:   Reason for Stopping:         amitriptyline (ELAVIL) 100 mg tablet Comments:   Reason for Stopping:                     Follow-up Information     Follow up With Specialties Details Why Contact Buck    [facility]  On 12/22/2020 APPT: 12/22/20 @ 10:15am   Follow-up for Medication Mgmt Dr. Christophe Pérez  [facility] [place], [facility], [address] [phone]  FAX: [phone]      UNKNOWN            WOUND CARE: none needed. Prognosis:   Good / Johnsie Louis based on nature of patient's pathology/ies and treatment compliance issues. Prognosis is greatly dependent upon patient's ability to  follow up on psychiatric/psychotherapy appointments as well as to comply with psychiatric medications as prescribed. I certify that this patients inpatient psychiatric hospital services furnished since the previous certification were, and continue to be, required for treatment that could reasonably be expected to improve the patient's condition, or for diagnostic study, and that the patient continues to need, on a daily basis, active treatment furnished directly by or requiring the supervision of inpatient psychiatric facility personnel. In addition, the hospital records show that services furnished were intensive treatment services, admission or related services, or equivalent services.      Signed:  Aleah Batista NP  11/30/2020

## 2020-12-01 NOTE — BH NOTES
Behavioral Health Transition Record to Provider    Patient Name: Esther Kennedy  YOB: 1975  Medical Record Number: [id number]  Date of Admission: 11/25/2020  Date of Discharge: 11/30/2020    Attending Provider: No att. providers found  Discharging Provider: Trevor Guadalupe NP  To contact this individual call [phone] and ask the  to page. If unavailable, ask to be transferred to [facility] Provider on call. Dk Freedman Provider will be available on call 24/7 and during holidays. Primary Care Provider: UNKNOWN    Allergies no known allergies    Reason for Admission: The patient is a 79-year-old female with history of bipolar disorder who presents on a voluntary basis for admission with concerns about britney. [facility] that the patient has been elevated and unable to care for herself. Maria Eugenai Harrington were also allegations of significant substance abuse, which based on her urine drug screen, reportedly does not seem to be the case.  She presents as elevated and certainly hypomanic, if not fully manic. Jensen Rogers is somewhat inappropriate on interview and quite hyperverbal.  Reports indicate that she would have been held on a assisted order. Jensen Rogers reports recent decreasing sleep, getting 4 hours per night or so, decreasing appetite, elevated energy.  She does report some episodes of \"blacking out,\" but these have not been recent.  She uses marijuana regularly, but this has not changed.  She was maintained on Latuda, but cannot afford it and is considering [place] out of state.  She will no longer be able to obtain samples.  She requests to be placed something more affordable.     Ad[facility]: [facility] [F31.9]    * No surgery found *    Results for orders placed or performed during the hospital encounter of 85/70/17   METABOLIC PANEL, COMPREHENSIVE   Result Value Ref Range    Sodium 139 136 - 145 mmol/L    Potassium 4.2 3.5 - 5.1 mmol/L    Chloride 106 97 - 108 mmol/L    CO2 29 21 - 32 mmol/L    Anion gap 4 (L) 5 - 15 mmol/L    Glucose 88 65 - 100 mg/dL    BUN 14 6 - 20 MG/DL    Creatinine 0.87 0.55 - 1.02 MG/DL    BUN/Creatinine ratio 16 12 - 20      GFR est AA >60 >60 ml/min/1.73m2    GFR est non-AA >60 >60 ml/min/1.73m2    Calcium 9.1 8.5 - 10.1 MG/DL    Bilirubin, total 0.5 0.2 - 1.0 MG/DL    ALT (SGPT) 18 12 - 78 U/L    AST (SGOT) 11 (L) 15 - 37 U/L    Alk. phosphatase 71 45 - 117 U/L    Protein, total 6.3 (L) 6.4 - 8.2 g/dL    Albumin 3.1 (L) 3.5 - 5.0 g/dL    Globulin 3.2 2.0 - 4.0 g/dL    A-G Ratio 1.0 (L) 1.1 - 2.2     GLUCOSE, FASTING   Result Value Ref Range    Glucose 88 65 - 100 MG/DL   CBC W/O DIFF   Result Value Ref Range    WBC 7.3 3.6 - 11.0 K/uL    RBC 4.12 3.80 - 5.20 M/uL    HGB 12.4 11.5 - 16.0 g/dL    HCT 37.9 35.0 - 47.0 %    MCV 92.0 80.0 - 99.0 FL    MCH 30.1 26.0 - 34.0 PG    MCHC 32.7 30.0 - 36.5 g/dL    RDW 14.1 11.5 - 14.5 %    PLATELET 530 933 - 808 K/uL    MPV 10.1 8.9 - 12.9 FL    NRBC 0.0 0  WBC    ABSOLUTE NRBC 0.00 0.00 - 0.01 K/uL   TSH 3RD GENERATION   Result Value Ref Range    TSH 1.04 0.36 - 3.74 uIU/mL   LIPID PANEL   Result Value Ref Range    LIPID PROFILE          Cholesterol, total 138 <200 MG/DL    Triglyceride 76 <150 MG/DL    HDL Cholesterol 54 MG/DL    LDL, calculated 68.8 0 - 100 MG/DL    VLDL, calculated 15.2 MG/DL    CHOL/HDL Ratio 2.6 0.0 - 5.0     EKG, 12 LEAD, INITIAL   Result Value Ref Range    Ventricular Rate 75 BPM    Atrial Rate 75 BPM    P-R Interval 154 ms    QRS Duration 88 ms    Q-T Interval 408 ms    QTC Calculation (Bezet) 455 ms    Calculated P Axis 51 degrees    Calculated R Axis 49 degrees    Calculated T Axis 32 degrees    Diagnosis       Normal sinus rhythm  No previous ECGs available  Confirmed by Jese Nowak MD, Diana Salas ([id number]) on 11/26/2020 6:05:22 PM         Immunizations administered during this encounter: There is no immunization history on file for this patient.     Screening for Metabolic Disorders for Patients on Antipsychotic Medications  (Data obtained from the EMR)    Estimated Body Mass Index  Estimated body mass index is 34.37 kg/m² as calculated from the following:    Height as of this encounter: 5' 3\" (1.6 m). Weight as of this encounter: 88 kg (194 lb). Vital Signs/Blood Pressure  Visit Vitals  /67   Pulse 73   Temp 98.1 °F (36.7 °C)   Resp 16   Ht 5' 3\" (1.6 m)   Wt 88 kg (194 lb)   SpO2 98%   Breastfeeding No   BMI 34.37 kg/m²       Blood Glucose/Hemoglobin A1c  Lab Results   Component Value Date/Time    Glucose 88 11/26/2020 06:31 AM    Glucose 88 11/26/2020 06:31 AM       No results found for: HBA1C, HGBE8, BYQ5FNHV     Lipid Panel  Lab Results   Component Value Date/Time    Cholesterol, total 138 11/26/2020 06:31 AM    HDL Cholesterol 54 11/26/2020 06:31 AM    LDL, calculated 68.8 11/26/2020 06:31 AM    Triglyceride 76 11/26/2020 06:31 AM    CHOL/HDL Ratio 2.6 11/26/2020 06:31 AM        Discharge Diagnosis: Bipolar disorder, most recent episode manic; unspecified anxiety disorder. Discharge Plan: The patient Sherman Frazier exhibits the ability to control behavior in a less restrictive environment. Patient's level of functioning is improving. No assaultive/destructive behavior has been observed for the past 24 hours. No suicidal/homicidal threat or behavior has been observed for the past 24 hours. There is no evidence of serious medication side effects. Patient has not been in physical or protective restraints for at least the past 24 hours. If weapons involved, how are they secured? NA    Is patient aware of and in agreement with discharge plan? Yes    Arrangements for medication:  NA  Copy of discharge instructions to provider?:  [facility]    Arrangements for transportation home: male friend will pick pt up     Keep all follow up appointments as scheduled, continue to take prescribed medications per physician instructions.   Mental health crisis number:  632 or your local mental health crisis line number at [address] [facility]    A suicide Safety Plan is a document that supports someone when they are having thoughts of suicide. Warning Signs that indicate a suicidal crisis may be developing: What (situations, thoughts, feelings, body sensations, behaviors, etc.) do you experience that lets you know you are beginning to think about suicide? 1. Family drama      Internal Coping Strategies:  What things can I do (relaxation techniques, hobbies, physical activities, etc.) to take my mind off my problems without contacting another person? 1. yoga  2. Working out at gym  3. Read a book    People and social settings that provide distraction: Who can I call or where can I go to distract me? 1. Name: Jane Girard [phone]   2. Name: Mikhail Camacoh  programmed into my phone                                   3. Place:  [place] [id number]      4. Place:  [facility] whom I can ask for help: Who can I call when I need help - for example, friends, family, clergy, someone else? 1. [facility] [phone]  2. [facility] or [facility] agencies I can contact during a crisis: Who can I call for help - for example, my doctor, my psychiatrist, my psychologist, a mental health provider, a suicide hotline? 1. Clinician Name: Viktoria Melton  Phone: [phone]     2. Suicide Prevention Lifeline: 4-402-462-TALK (8467)  3. [facility]: [address], [facility], [address] [place] [address] Road  CRISIS:[phone]    Making the environment safe: How can I make my environment (house/apartment/living space) safer? For example, can I remove guns, medications, and other items? 1. Healthy boundaries with family members  2.  Share my safety plan with my support system    Discharge Medication List and Instructions:   Discharge Medication List as of 11/30/2020  6:01 PM      START taking these medications    Details ARIPiprazole (ABILIFY) 15 mg tablet Take 1 Tab by mouth nightly. Indications: britney associated with bipolar disorder, Normal, Disp-30 Tab,R-0      hydrOXYzine HCL (ATARAX) 50 mg tablet Take 1 Tab by mouth three (3) times daily as needed for Anxiety. Indications: anxious, Normal, Disp-30 Tab,R-0      traZODone (DESYREL) 50 mg tablet Take 1 Tab by mouth nightly as needed for Sleep (For insomnia). Indications: insomnia associated with depression, Normal, Disp-30 Tab,R-0         STOP taking these medications       lurasidone (Latuda) 40 mg tab tablet Comments:   Reason for Stopping:         amitriptyline (ELAVIL) 100 mg tablet Comments:   Reason for Stopping:               Unresulted Labs (24h ago, onward)    None        To obtain results of studies pending at discharge, please contact [phone]    Follow-up Information     Follow up With Specialties Details Why Contact Buck    [facility]  On 12/22/2020 APPT: 12/22/20 @ 10:15am   Follow-up for Medication Mgmt Dr. Morales  [address] [place] [address], [id number] [phone]  FAX: [phone]      UNKNOWN              Advanced Directive:   Does the patient have an appointed surrogate decision maker? No  Does the patient have a Medical Advance Directive? No  Does the patient have a Psychiatric Advance Directive? No  If the patient does not have a surrogate or Medical Advance Directive AND Psychiatric Advance Directive, the patient was offered information on these advance directives Patient declined to complete    Patient Instructions: Please continue all medications until otherwise directed by physician. Tobacco Cessation Discharge Plan:   Is the patient a smoker and needs referral for smoking cessation? Yes  Patient referred to the following for smoking cessation with an appointment? Refused     Patient was offered medication to assist with smoking cessation at discharge?  No  Was education for smoking cessation added to the discharge instructions? Yes    Alcohol/Substance Abuse Discharge Plan:   Does the patient have a history of substance/alcohol abuse and requires a referral for treatment? Yes  Patient referred to the following for substance/alcohol abuse treatment with an appointment? Refused  Patient was offered medication to assist with alcohol cessation at discharge? No  Was education for substance/alcohol abuse added to discharge instructions? Yes    Patient discharged to Home; discussed with patient/caregiver and provided to the patient/caregiver either in hard copy or electronically.

## 2022-03-18 PROBLEM — F31.9 BIPOLAR DISORDER (HCC): Status: ACTIVE | Noted: 2020-11-25

## 2023-05-14 RX ORDER — HYDROXYZINE 50 MG/1
50 TABLET, FILM COATED ORAL 3 TIMES DAILY PRN
COMMUNITY
Start: 2020-11-30

## 2023-05-14 RX ORDER — ARIPIPRAZOLE 15 MG/1
15 TABLET ORAL
COMMUNITY
Start: 2020-11-30

## 2023-05-14 RX ORDER — TRAZODONE HYDROCHLORIDE 50 MG/1
50 TABLET ORAL
COMMUNITY
Start: 2020-11-30